# Patient Record
Sex: FEMALE
[De-identification: names, ages, dates, MRNs, and addresses within clinical notes are randomized per-mention and may not be internally consistent; named-entity substitution may affect disease eponyms.]

---

## 2021-01-06 PROBLEM — Z00.00 ENCOUNTER FOR PREVENTIVE HEALTH EXAMINATION: Status: ACTIVE | Noted: 2021-01-06

## 2021-01-07 ENCOUNTER — APPOINTMENT (OUTPATIENT)
Dept: PULMONOLOGY | Facility: CLINIC | Age: 26
End: 2021-01-07
Payer: COMMERCIAL

## 2021-01-07 ENCOUNTER — NON-APPOINTMENT (OUTPATIENT)
Age: 26
End: 2021-01-07

## 2021-01-07 VITALS
OXYGEN SATURATION: 98 % | TEMPERATURE: 96.7 F | DIASTOLIC BLOOD PRESSURE: 67 MMHG | HEART RATE: 85 BPM | SYSTOLIC BLOOD PRESSURE: 102 MMHG

## 2021-01-07 DIAGNOSIS — J45.909 UNSPECIFIED ASTHMA, UNCOMPLICATED: ICD-10-CM

## 2021-01-07 DIAGNOSIS — J45.21 MILD INTERMITTENT ASTHMA WITH (ACUTE) EXACERBATION: ICD-10-CM

## 2021-01-07 DIAGNOSIS — R56.9 UNSPECIFIED CONVULSIONS: ICD-10-CM

## 2021-01-07 DIAGNOSIS — S27.309A UNSPECIFIED INJURY OF LUNG, UNSPECIFIED, INITIAL ENCOUNTER: ICD-10-CM

## 2021-01-07 PROCEDURE — 99215 OFFICE O/P EST HI 40 MIN: CPT

## 2021-01-07 PROCEDURE — 99072 ADDL SUPL MATRL&STAF TM PHE: CPT

## 2021-01-07 RX ORDER — CLOBAZAM 20 MG/1
TABLET ORAL
Refills: 0 | Status: ACTIVE | COMMUNITY

## 2021-01-07 RX ORDER — LEVETIRACETAM 1000 MG/1
TABLET, FILM COATED ORAL
Refills: 0 | Status: ACTIVE | COMMUNITY

## 2021-01-07 RX ORDER — LAMOTRIGINE 2 MG/1
TABLET, FOR SUSPENSION ORAL
Refills: 0 | Status: ACTIVE | COMMUNITY

## 2021-01-07 RX ORDER — FLUTICASONE FUROATE AND VILANTEROL TRIFENATATE 100; 25 UG/1; UG/1
100-25 POWDER RESPIRATORY (INHALATION) DAILY
Qty: 1 | Refills: 6 | Status: ACTIVE | COMMUNITY
Start: 2021-01-07 | End: 1900-01-01

## 2021-01-07 NOTE — HISTORY OF PRESENT ILLNESS
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

## 2021-01-07 NOTE — DISCUSSION/SUMMARY
[FreeTextEntry1] : patient ahs intermittent asthma symptoms ?viral exposure\par history chemical exposure 2 yrs ago ?reactive airways disease syndrome\par pulmonary function tests 2 y ago +bronchodilator effect small airways\par will start breo 100 qd and see if any improvement

## 2021-01-07 NOTE — REASON FOR VISIT
[Initial] : an initial visit [Cough] : cough [TextBox_44] : PT IS A 26 YO FEMALE. PT HAS PERSISTENT COUGH POSSIBLY STEMMING BACK TO CHEMICAL EXPOSURE - PT WAS SEEN BY DR PALOMINO A COUPLE OF YEARS AGO FOR THIS.  PT ALSO COMPLAINS OF SOB AND WHEEZE. PT DENIES ANY FEVER, CHILLS, CHEST PAIN OR TIGHTNESS WHEN BREATHING. PT HAD 2 EPISODES WITH MILD FEVER, COUGH - TESTED COVID NEGATIVE BOTH TIMES. PT WAS GIVEN STEROIDS, ANTIBIOTIC AND RESCUE INHALER - NONE WORKED, PROBLEM RESOLVED ON OWN. PT HAD AN ADVERSE REACTION TO ALBUTEROL.  PT HAD CXR DECEMBER, RESULTS SCANNED TO CHART.

## 2021-02-12 ENCOUNTER — APPOINTMENT (OUTPATIENT)
Dept: PULMONOLOGY | Facility: CLINIC | Age: 26
End: 2021-02-12

## 2025-06-24 ENCOUNTER — OFFICE (OUTPATIENT)
Dept: URBAN - METROPOLITAN AREA CLINIC 12 | Facility: CLINIC | Age: 30
Setting detail: OPHTHALMOLOGY
End: 2025-06-24
Payer: COMMERCIAL

## 2025-06-24 DIAGNOSIS — T15.02XA: ICD-10-CM

## 2025-06-24 PROBLEM — S05.02XA CORNEAL ABRASION; INITIAL ENCOUNTER: Status: ACTIVE | Noted: 2025-06-24

## 2025-06-24 PROCEDURE — 65222 REMOVE FOREIGN BODY FROM EYE: CPT | Mod: LT | Performed by: STUDENT IN AN ORGANIZED HEALTH CARE EDUCATION/TRAINING PROGRAM

## 2025-06-24 ASSESSMENT — TONOMETRY
OS_IOP_MMHG: 13
OD_IOP_MMHG: 14

## 2025-06-24 ASSESSMENT — REFRACTION_AUTOREFRACTION
OD_SPHERE: +0.25
OS_CYLINDER: -0.75
OS_SPHERE: PLANO
OS_AXIS: 069
OD_CYLINDER: -0.50
OD_AXIS: 149

## 2025-06-24 ASSESSMENT — KERATOMETRY
OS_K1POWER_DIOPTERS: 44.00
OS_AXISANGLE_DEGREES: 138
OD_AXISANGLE_DEGREES: 070
OD_K2POWER_DIOPTERS: 44.75
OD_K1POWER_DIOPTERS: 43.75
OS_K2POWER_DIOPTERS: 44.75

## 2025-06-24 ASSESSMENT — CORNEAL TRAUMA - ABRASION: OS_ABRASION: PRESENT

## 2025-06-24 ASSESSMENT — VISUAL ACUITY
OD_BCVA: 20/40-1
OS_BCVA: 20/30+1

## 2025-06-24 ASSESSMENT — CORNEAL TRAUMA - FOREIGN BODY: OS_FOREIGNBODY: NON METALLIC PRESENT

## 2025-06-24 ASSESSMENT — CONFRONTATIONAL VISUAL FIELD TEST (CVF)
OD_FINDINGS: FULL
OS_FINDINGS: FULL